# Patient Record
Sex: MALE | URBAN - METROPOLITAN AREA
[De-identification: names, ages, dates, MRNs, and addresses within clinical notes are randomized per-mention and may not be internally consistent; named-entity substitution may affect disease eponyms.]

---

## 2024-08-26 ENCOUNTER — TELEPHONE (OUTPATIENT)
Dept: GASTROENTEROLOGY | Facility: CLINIC | Age: 34
End: 2024-08-26

## 2024-08-26 NOTE — TELEPHONE ENCOUNTER
----- Message from Luis Donato sent at 8/26/2024  3:13 PM CDT -----  Sooner Appointment Request    Caller is requesting a sooner appointment.  Caller declined first available appointment listed below.  Caller will not accept being placed on the waitlist and is requesting a message be sent to doctor.  Name of Caller:PT  When is the first available appointment?1/1/2025  Symptoms:irregular bowel/constipation/blood in stool  Would the patient rather a call back or a response via [facility]? CALL  Best Call Back Number:Telephone Information:  Mobile          [phone]      Additional Information: Pt requesting soonest appt. Please advise as pt will be self pay

## 2024-08-28 ENCOUNTER — TELEPHONE (OUTPATIENT)
Dept: GASTROENTEROLOGY | Facility: CLINIC | Age: 34
End: 2024-08-28

## 2024-08-28 NOTE — TELEPHONE ENCOUNTER
----- Message from Ana Cristina Rashid sent at 8/28/2024  8:03 AM CDT -----  Contact: Gianfranco  Type:  Patient Returning Call    Who Called:Gianfranco  Who Left Message for Patient:unknown  Does the patient know what this is regarding?:Appointment scheduling  Would the patient rather a call back or a response via [facility]? call  Best Call Back Number:[phone]  Additional Information: Patient reports missing a call and request assistance in scheduling for an appointment due to symptoms of gas and blood in stool. Please give patient a call back to assist. If no answer, please send patient an e-mail or leave a voice message.   Thank you,  GH

## 2024-08-28 NOTE — TELEPHONE ENCOUNTER
Contacted pt regarding apt. Pt did not have insurance and once told her would have to pay 500 to be seen pt stated he will think about it and call back.